# Patient Record
Sex: FEMALE | Race: WHITE | NOT HISPANIC OR LATINO
[De-identification: names, ages, dates, MRNs, and addresses within clinical notes are randomized per-mention and may not be internally consistent; named-entity substitution may affect disease eponyms.]

---
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
## 2020-02-19 ENCOUNTER — APPOINTMENT (OUTPATIENT)
Dept: NEUROLOGY | Facility: CLINIC | Age: 20
End: 2020-02-19

## 2020-02-27 ENCOUNTER — APPOINTMENT (OUTPATIENT)
Dept: THORACIC SURGERY | Facility: CLINIC | Age: 20
End: 2020-02-27

## 2020-05-28 ENCOUNTER — APPOINTMENT (OUTPATIENT)
Dept: THORACIC SURGERY | Facility: CLINIC | Age: 20
End: 2020-05-28

## 2020-05-28 ENCOUNTER — APPOINTMENT (OUTPATIENT)
Dept: NEUROLOGY | Facility: CLINIC | Age: 20
End: 2020-05-28

## 2020-05-29 ENCOUNTER — APPOINTMENT (OUTPATIENT)
Dept: THORACIC SURGERY | Facility: CLINIC | Age: 20
End: 2020-05-29
Payer: COMMERCIAL

## 2020-05-29 PROCEDURE — 99202 OFFICE O/P NEW SF 15 MIN: CPT | Mod: 95

## 2020-06-01 ENCOUNTER — APPOINTMENT (OUTPATIENT)
Dept: NEUROSURGERY | Facility: CLINIC | Age: 20
End: 2020-06-01
Payer: COMMERCIAL

## 2020-06-01 PROCEDURE — 99204 OFFICE O/P NEW MOD 45 MIN: CPT | Mod: 95

## 2020-06-01 NOTE — DATA REVIEWED
[de-identified] : I have reviewed the most recent MRI which shows no evidence of brachial plexus compression [de-identified] : No evidence of vte on RUE doppler

## 2020-06-01 NOTE — REASON FOR VISIT
[New Patient Visit] : a new patient visit [Referred By: _________] : Patient was referred by GUILHERME [FreeTextEntry1] : thoracic outlet syndrom

## 2020-06-01 NOTE — ASSESSMENT
[FreeTextEntry1] : My impression is that the patient suffers from  TOS .   The patient’s established problem of RUE pain and limited ROM  is progressing. I had a long discussion with the patient regarding the role of robotic decompression with Dr. Rodas .  The patient was extensively educated about the nature of her disease process. Therapeutic and diagnostic tests include repat EMG with Dr. Raya. Dr. Rodas's office will arrange surgical clearance. I have explained the alternatives, risks and benefits to the patient and she understands and agrees to proceed.  This risk of the procedure including but not limited to pain, infection, seizure, stroke, recurrence, residual disease, neurovascular injury, heart attack, pulmonary embolism, blindness, weakness, paralysis and death have been carefully explained to the patient who clearly understands and agrees to proceed.\par

## 2020-06-01 NOTE — HISTORY OF PRESENT ILLNESS
[Home] : at home, [unfilled] , at the time of the visit. [Medical Office: ([facility])___] : at the medical office located in  [Verbal consent obtained from patient] : the patient, [unfilled] [FreeTextEntry1] : 20 yr old female, nonsmoker, with no pertinent medical history referred by Dr. Rodas for evaluation of thoracic outlet syndrome. 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 She had PT and took steroids/anti-inflammatories for many months without much relief. She presents today for re-evaluation.\par \par EMG completed on 02/22/19:\par -normal study \par \par \par

## 2020-06-02 ENCOUNTER — APPOINTMENT (OUTPATIENT)
Dept: NEUROLOGY | Facility: CLINIC | Age: 20
End: 2020-06-02
Payer: COMMERCIAL

## 2020-06-02 VITALS
TEMPERATURE: 98.3 F | HEART RATE: 88 BPM | WEIGHT: 112 LBS | DIASTOLIC BLOOD PRESSURE: 80 MMHG | OXYGEN SATURATION: 100 % | SYSTOLIC BLOOD PRESSURE: 122 MMHG | HEIGHT: 59 IN | BODY MASS INDEX: 22.58 KG/M2

## 2020-06-02 DIAGNOSIS — M25.511 PAIN IN RIGHT SHOULDER: ICD-10-CM

## 2020-06-02 DIAGNOSIS — M79.601 PAIN IN RIGHT ARM: ICD-10-CM

## 2020-06-02 DIAGNOSIS — R20.0 ANESTHESIA OF SKIN: ICD-10-CM

## 2020-06-02 PROCEDURE — 95885 MUSC TST DONE W/NERV TST LIM: CPT

## 2020-06-02 PROCEDURE — 95909 NRV CNDJ TST 5-6 STUDIES: CPT

## 2020-06-02 PROCEDURE — 99213 OFFICE O/P EST LOW 20 MIN: CPT | Mod: 25

## 2020-06-02 NOTE — PHYSICAL EXAM
[FreeTextEntry1] : UE strength 5/5 symmetric\par Reflexes in UE 1+ symmetric\par LT/PP intact and symmetric in UE b/l\par Gait normal \par Pain with tilting head to the left and lifting right arm up above shoulder

## 2020-06-02 NOTE — PROCEDURE
[FreeTextEntry1] : Nerve Conduction and Electromyography Report [FreeTextEntry3] : Electro Physiologic Findings:\par \par Limb temperature was monitored and maintained at approximately 32 – 36° C in the upper extremities.\par \par The right median, ulnar, medial and lateral antebrachial cutaneous SNAPs were normal. The right median and ulnar motor responses, including F-wave latencies, were also normal. All responses were approximately stable compared to the prior study in September 2019.\par \par Needle electromyography was performed on the right first dorsal interosseous, abductor pollicis brevis, and extensor digitorum communis muscles. All muscles tested were normal without evidence of active or chronic denervation. \par \par Clinical Electrophysiological Impression: \par \par This repeat electrodiagnostic study demonstrated stability of all sensory and motor responses in the right arm compared to the prior study. There was no definite evidence of lower brachial plexopathy or median or ulnar mononeuropathy.

## 2020-06-02 NOTE — ASSESSMENT
[FreeTextEntry1] : NCS unchanged from prior study in 9/2019\par Limited EMG of RUE unremarkable\par Some of her symptoms are consistent with neurogenic TOS especially provoking movements, although the tremor is not typical \par f/u with Dr. Ugalde, Dr. Schrader for  surgical planning \par \par See separate procedure note for full results of study.

## 2020-06-02 NOTE — HISTORY OF PRESENT ILLNESS
[FreeTextEntry1] : Last seen in September 2019\par Symptoms have gotten worse, in terms of pain in right anterior neck / above clavicle\par Also numbness in the medial right arm / forearm at times, does not go down to the hands\par Pain is worse when she lifts arm up\par She saw Dr. Nguyen who did not find evidence of venous TOS \par \par 13 pt review of systems performed and reviewed with patient (General, Eyes, Ears, Cardiovascular, Respiratory, Gastrointestinal, Genitourinary, Musculoskeletal, Skin, Endocrine, Hematologic, Psychiatric, Neurologic)\par Past medical history, surgical history, social history, and family history reviewed with patient\par See scanned document for details

## 2020-06-03 ENCOUNTER — ASOB RESULT (OUTPATIENT)
Age: 20
End: 2020-06-03

## 2020-06-03 ENCOUNTER — APPOINTMENT (OUTPATIENT)
Dept: OBGYN | Facility: CLINIC | Age: 20
End: 2020-06-03
Payer: COMMERCIAL

## 2020-06-03 VITALS
TEMPERATURE: 98 F | BODY MASS INDEX: 23.39 KG/M2 | DIASTOLIC BLOOD PRESSURE: 76 MMHG | HEART RATE: 85 BPM | HEIGHT: 59 IN | SYSTOLIC BLOOD PRESSURE: 139 MMHG | WEIGHT: 116 LBS

## 2020-06-03 DIAGNOSIS — Z01.411 ENCOUNTER FOR GYNECOLOGICAL EXAMINATION (GENERAL) (ROUTINE) WITH ABNORMAL FINDINGS: ICD-10-CM

## 2020-06-03 DIAGNOSIS — R10.2 PELVIC AND PERINEAL PAIN: ICD-10-CM

## 2020-06-03 DIAGNOSIS — Z30.9 ENCOUNTER FOR CONTRACEPTIVE MANAGEMENT, UNSPECIFIED: ICD-10-CM

## 2020-06-03 PROCEDURE — 99213 OFFICE O/P EST LOW 20 MIN: CPT | Mod: 25

## 2020-06-03 PROCEDURE — 76857 US EXAM PELVIC LIMITED: CPT

## 2020-06-03 PROCEDURE — 99395 PREV VISIT EST AGE 18-39: CPT

## 2020-06-03 PROCEDURE — 76830 TRANSVAGINAL US NON-OB: CPT | Mod: 59

## 2020-06-03 RX ORDER — AMOXICILLIN 875 MG/1
875 TABLET, FILM COATED ORAL
Qty: 14 | Refills: 0 | Status: ACTIVE | COMMUNITY
Start: 2020-05-30

## 2020-06-03 RX ORDER — NORETHINDRONE ACETATE AND ETHINYL ESTRADIOL 1.5-30(21)
1.5-3 KIT ORAL DAILY
Qty: 3 | Refills: 3 | Status: ACTIVE | COMMUNITY
Start: 2020-06-03 | End: 1900-01-01

## 2020-06-03 NOTE — DISCUSSION/SUMMARY
[FreeTextEntry1] : Patient here for annual exam and evaluation of pelvic pain.\par Will get sonogram to help assess.\par Continue oral contraceptives.\par \par Deborah Perez M.D.\par

## 2020-06-03 NOTE — CHIEF COMPLAINT
[Annual Visit] : annual visit [FreeTextEntry1] : Patient is here for annual exam  on [place] FE,, patient complaint  pelvic pain 3-4 days , no nausea ,,no fever , no changes with urination or bowel movement.

## 2020-06-03 NOTE — ASSESSMENT
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 She has been quarantining at home due to the COVID pandemic and reports pain has been worsening even with light physical activity and at rest. When raising right arm, arm and fingers would feel numb. \par \par Thoracic outlet syndrome (TOS) occurs when nerves, arteries, veins are compressed by the first rib. It is caused by repetitive activity or injury of to shoulder, arm, commonly seen in softball players. Patients often report pain, numbness/tingling in the affected shoulder and arm, especially when raised up. If symptoms persist after physical therapy, surgery may be recommended. Surgery involves resection of first rib and cutting into the scalene muscles to decompress the brachial plexus, artery, veins. Prior to surgery, patient will need to be evaluated by a neurosurgeon as well. Many patients reports pain relief after surgery, but there is also a chance that symptoms may reoccur. \par \par Advised patient to undergo RVATs, robotic assisted, right first rib resection, arteriolysis and venolysis. The surgical approach, risks, benefits, and alternatives were explained to the patient, who understood and agreed to the above. \par \par I have reviewed the patient's medical records and diagnostic images at the time of this office consultation and have made the following recommendation.\par Plan:\par 1. Follow-up with Dr Raya for EMG\par 2. Referral to Dr Schrader for pre-op evaluation\par 3. After above, book for RVATs, robotic assisted, right first rib resection, arteriolysis and venolysis in June/July no loss of consciousness/no nausea/no vomiting/no change in level of consciousness

## 2020-06-03 NOTE — PHYSICAL EXAM
[Awake] : awake [Alert] : alert [Acute Distress] : no acute distress [Mass] : no breast mass [Nipple Discharge] : no nipple discharge [Axillary LAD] : no axillary lymphadenopathy [Soft] : soft [Distended] : not distended [Tender] : non tender [H/Smegaly] : no hepatosplenomegaly [Oriented x3] : oriented to person, place, and time [No Bleeding] : there was no active vaginal bleeding [Normal] : uterus [Uterine Adnexae] : were not tender and not enlarged [FreeTextEntry7] : Bilateral full adnexa

## 2020-06-03 NOTE — HISTORY OF PRESENT ILLNESS
[Home] : at home, [unfilled] , at the time of the visit. [Medical Office: ([facility])___] : at the medical office located in  [Verbal consent obtained from patient] : the patient, [unfilled] [FreeTextEntry1] : 20 yr old female, nonsmoker, with no pertinent medical history referred by orthopedist Dr Reynolds for evaluation of thoracic outlet syndrome. 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 She had PT and took steroids/anti-inflammatories for many months without much relief. She presents today for re-evaluation.\par \par EMG completed on 02/22/19:\par -normal study \par \par MRI Brain 3/2/19: No evidence of T2/FLAIR signal abnormality identified to suggest demyelinating disease\par \par MRI cervical spine 3/2/19: No evidence of signal abnormality identified in the cervical cord to suggest demyelinating disease. Broad-based left paracentral disc protrusion of C5-6 which moderately indents the anterior thecal sac on the left without cord deformity. Straightening of the normal cervical lordosis. \par \par MRI shoulder completed on 04/17/19:\par -no bone marrow contusion or fracture\par -no AC joint strain \par -no surrounding soft tissue edema or inflammation\par -no subacromial/subdeltoid bursal fluid\par \par MRI elbow without contrast 04/17/19:\par -bone marrow has normal morphology and signal intensity\par -no contusion or fracture\par -no chondromalacia or arthritis\par -no subchondral edema or cystic change\par -biceps and brachialis tendon have normal morphology and signal with no tendinosis or tear. Triceps tendon and attachment is unremarkable. \par \par Right upper extremity Venous Duplex completed on 04/17/19:\par -no evidence of deep venous thrombosis or superficial thrombophlebitis along the right upper extremity\par \par MRA chest completed on 07/17/19:\par -no evidence of thoracic outlet syndrome\par \par MRI brachial plexus completed on 10/17/19:\par - no extrinsic compression or focal abnormality of right brachial plexus\par

## 2020-06-03 NOTE — END OF VISIT
[FreeTextEntry3] : I, PITA CEDEÑO , am scribing for and in the presence of KHUSHBU STEVENS the following sections: history of present illness, past medical/family/surgical/family/social history, review of systems, vital signs, physical exam, and disposition.\par

## 2020-06-03 NOTE — HISTORY OF PRESENT ILLNESS
[Menarche Age: ____] : age at menarche was [unfilled] [Definite:  ___ (Date)] : the last menstrual period was [unfilled] [Sexually Active] : is sexually active [Contraception] : uses contraception [Male ___] : [unfilled] male [Oral Contraceptives] : uses oral contraceptives [Yes] : yes [___ Year(s) Ago] : [unfilled] year(s) ago [Good] : being in good health [Reproductive Age] : is of reproductive age [Lower-Rt-Q] : lower right quadrant [Lower-Lt-Q] : left lower quadrant [Sharp] : sharp [___ Days] : started [unfilled] days ago [Fever] : no fever [6/10] : is 6/10 in severity [Vomiting] : no vomiting [Nausea] : no nausea [Diarrhea] : no diarrhea [Vaginal Discharge] : no vaginal discharge [Vaginal Bleeding] : no vaginal bleeding [Dysuria] : no dysuria [Pelvic Pressure] : no pelvic pressure [Pain with BMs] : no pain with bowel movements [Dysmenorrhea] : no dysmenorrhea

## 2020-06-04 LAB
C TRACH RRNA SPEC QL NAA+PROBE: NOT DETECTED
N GONORRHOEA RRNA SPEC QL NAA+PROBE: NOT DETECTED
SOURCE AMPLIFICATION: NORMAL

## 2020-06-17 ENCOUNTER — RX RENEWAL (OUTPATIENT)
Age: 20
End: 2020-06-17

## 2020-06-20 ENCOUNTER — APPOINTMENT (OUTPATIENT)
Dept: DISASTER EMERGENCY | Facility: CLINIC | Age: 20
End: 2020-06-20

## 2020-06-22 ENCOUNTER — RX RENEWAL (OUTPATIENT)
Age: 20
End: 2020-06-22

## 2020-06-26 VITALS
DIASTOLIC BLOOD PRESSURE: 76 MMHG | OXYGEN SATURATION: 100 % | TEMPERATURE: 98 F | RESPIRATION RATE: 18 BRPM | HEART RATE: 85 BPM | SYSTOLIC BLOOD PRESSURE: 139 MMHG | WEIGHT: 115.96 LBS | HEIGHT: 59 IN

## 2020-06-26 NOTE — H&P ADULT - NSHPLABSRESULTS_GEN_ALL_CORE
EMG completed on 02/22/19:  -normal study     MRI Brain 3/2/19: No evidence of T2/FLAIR signal abnormality identified to suggest demyelinating disease    MRI cervical spine 3/2/19: No evidence of signal abnormality identified in the cervical cord to suggest demyelinating disease. Broad-based left paracentral disc protrusion of C5-6 which moderately indents the anterior thecal sac on the left without cord deformity. Straightening of the normal cervical lordosis.     MRI shoulder completed on 04/17/19:  -no bone marrow contusion or fracture  -no AC joint strain   -no surrounding soft tissue edema or inflammation  -no subacromial/subdeltoid bursal fluid    MRI elbow without contrast 04/17/19:  -bone marrow has normal morphology and signal intensity  -no contusion or fracture  -no chondromalacia or arthritis  -no subchondral edema or cystic change  -biceps and brachialis tendon have normal morphology and signal with no tendinosis or tear. Triceps tendon and attachment is unremarkable.     Right upper extremity Venous Duplex completed on 04/17/19:  -no evidence of deep venous thrombosis or superficial thrombophlebitis along the right upper extremity    MRA chest completed on 07/17/19:  -no evidence of thoracic outlet syndrome    MRI brachial plexus completed on 10/17/19:  - no extrinsic compression or focal abnormality of right brachial plexus

## 2020-06-26 NOTE — H&P ADULT - HISTORY OF PRESENT ILLNESS
20 yr old female, nonsmoker, with no pertinent medical history referred by orthopedist Dr Reyonlds for evaluation of thoracic outlet syndrome. 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 She had PT and took steroids/anti-inflammatories for many months without much relief. She presents today for re-evaluation.    EMG completed on 02/22/19:  -normal study     MRI Brain 3/2/19: No evidence of T2/FLAIR signal abnormality identified to suggest demyelinating disease    MRI cervical spine 3/2/19: No evidence of signal abnormality identified in the cervical cord to suggest demyelinating disease. Broad-based left paracentral disc protrusion of C5-6 which moderately indents the anterior thecal sac on the left without cord deformity. Straightening of the normal cervical lordosis.     MRI shoulder completed on 04/17/19:  -no bone marrow contusion or fracture  -no AC joint strain   -no surrounding soft tissue edema or inflammation  -no subacromial/subdeltoid bursal fluid    MRI elbow without contrast 04/17/19:  -bone marrow has normal morphology and signal intensity  -no contusion or fracture  -no chondromalacia or arthritis  -no subchondral edema or cystic change  -biceps and brachialis tendon have normal morphology and signal with no tendinosis or tear. Triceps tendon and attachment is unremarkable.     Right upper extremity Venous Duplex completed on 04/17/19:  -no evidence of deep venous thrombosis or superficial thrombophlebitis along the right upper extremity    MRA chest completed on 07/17/19:  -no evidence of thoracic outlet syndrome    MRI brachial plexus completed on 10/17/19:  - no extrinsic compression or focal abnormality of right brachial plexus 20 yr old female, nonsmoker, with no pertinent medical history referred by orthopedist Dr Reynolds for evaluation of thoracic outlet syndrome. 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 She had PT and took steroids/anti-inflammatories for many months without much relief. She presents today for re-evaluation.    EMG completed on 02/22/19:  -normal study     MRI Brain 3/2/19: No evidence of T2/FLAIR signal abnormality identified to suggest demyelinating disease    MRI cervical spine 3/2/19: No evidence of signal abnormality identified in the cervical cord to suggest demyelinating disease. Broad-based left paracentral disc protrusion of C5-6 which moderately indents the anterior thecal sac on the left without cord deformity. Straightening of the normal cervical lordosis.     MRI shoulder completed on 04/17/19:  -no bone marrow contusion or fracture  -no AC joint strain   -no surrounding soft tissue edema or inflammation  -no subacromial/subdeltoid bursal fluid    MRI elbow without contrast 04/17/19:  -bone marrow has normal morphology and signal intensity  -no contusion or fracture  -no chondromalacia or arthritis  -no subchondral edema or cystic change  -biceps and brachialis tendon have normal morphology and signal with no tendinosis or tear. Triceps tendon and attachment is unremarkable.     Right upper extremity Venous Duplex completed on 04/17/19:  -no evidence of deep venous thrombosis or superficial thrombophlebitis along the right upper extremity    MRA chest completed on 07/17/19:  -no evidence of thoracic outlet syndrome    MRI brachial plexus completed on 10/17/19:  - no extrinsic compression or focal abnormality of right brachial plexus    Same Day Holding Area: 6/30/20  Patient seen in same day holding area; Reports no changes to PMHx or medications since last seen by our team. Denies acute or current SOB, chest pain, palpitation, N/V/D, fever/chills, recent illness, or any other concerning symptoms. No sick contacts. Feels well today but is nervous for the procedure.

## 2020-06-26 NOTE — H&P ADULT - ASSESSMENT
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 She has been quarantining at home due to the COVID pandemic and reports pain has been worsening even with light physical activity and at rest. When raising right arm, arm and fingers would feel numb.     Thoracic outlet syndrome (TOS) occurs when nerves, arteries, veins are compressed by the first rib. It is caused by repetitive activity or injury of to shoulder, arm, commonly seen in softball players. Patients often report pain, numbness/tingling in the affected shoulder and arm, especially when raised up. If symptoms persist after physical therapy, surgery may be recommended. Surgery involves resection of first rib and cutting into the scalene muscles to decompress the brachial plexus, artery, veins. Prior to surgery, patient will need to be evaluated by a neurosurgeon as well. Many patients reports pain relief after surgery, but there is also a chance that symptoms may reoccur.     Advised patient to undergo RVATs, robotic assisted, right first rib resection, arteriolysis and venolysis. The surgical approach, risks, benefits, and alternatives were explained to the patient, who understood and agreed to the above.     I have reviewed the patient's medical records and diagnostic images at the time of this office consultation and have made the following recommendation.  Plan:  1. Follow-up with Dr Raya for EMG  2. 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 She has been quarantining at home due to the COVID pandemic and reports pain has been worsening even with light physical activity and at rest. When raising right arm, arm and fingers would feel numb.     Thoracic outlet syndrome (TOS) occurs when nerves, arteries, veins are compressed by the first rib. It is caused by repetitive activity or injury of to shoulder, arm, commonly seen in softball players. Patients often report pain, numbness/tingling in the affected shoulder and arm, especially when raised up. If symptoms persist after physical therapy, surgery may be recommended. Surgery involves resection of first rib and cutting into the scalene muscles to decompress the brachial plexus, artery, veins. Prior to surgery, patient will need to be evaluated by a neurosurgeon as well. Many patients reports pain relief after surgery, but there is also a chance that symptoms may reoccur.     Advised patient to undergo RVATs, robotic assisted, right first rib resection, arteriolysis and venolysis. The surgical approach, risks, benefits, and alternatives were explained to the patient, who understood and agreed to the above.     I have reviewed the patient's medical records and diagnostic images at the time of this office consultation and have made the following recommendation.  Plan:  1. Follow-up with Dr Raya for EMG  2. Referral to Dr Schrader for pre-op evaluation      Admit under Dr. Ugalde via same day surgery. Consent signed, placed on chart.  Risks/benefits reviewed, patient understands and agrees. T&S ordered and blood products placed on hold for OR.

## 2020-06-27 ENCOUNTER — APPOINTMENT (OUTPATIENT)
Dept: DISASTER EMERGENCY | Facility: CLINIC | Age: 20
End: 2020-06-27

## 2020-06-27 LAB — SARS-COV-2 N GENE NPH QL NAA+PROBE: NOT DETECTED

## 2020-06-29 ENCOUNTER — TRANSCRIPTION ENCOUNTER (OUTPATIENT)
Age: 20
End: 2020-06-29

## 2020-06-30 ENCOUNTER — RESULT REVIEW (OUTPATIENT)
Age: 20
End: 2020-06-30

## 2020-06-30 ENCOUNTER — APPOINTMENT (OUTPATIENT)
Dept: THORACIC SURGERY | Facility: HOSPITAL | Age: 20
End: 2020-06-30

## 2020-06-30 ENCOUNTER — INPATIENT (INPATIENT)
Facility: HOSPITAL | Age: 20
LOS: 0 days | Discharge: HOME | DRG: 30 | End: 2020-07-01
Attending: THORACIC SURGERY (CARDIOTHORACIC VASCULAR SURGERY) | Admitting: THORACIC SURGERY (CARDIOTHORACIC VASCULAR SURGERY)
Payer: COMMERCIAL

## 2020-06-30 DIAGNOSIS — Z90.49 ACQUIRED ABSENCE OF OTHER SPECIFIED PARTS OF DIGESTIVE TRACT: Chronic | ICD-10-CM

## 2020-06-30 LAB
ANION GAP SERPL CALC-SCNC: 10 MMOL/L — SIGNIFICANT CHANGE UP (ref 5–17)
APTT BLD: 25.8 SEC — LOW (ref 27.5–35.5)
BASE EXCESS BLDA CALC-SCNC: -2.4 MMOL/L — LOW (ref -2–3)
BASE EXCESS BLDA CALC-SCNC: -5.7 MMOL/L — LOW (ref -2–3)
BASOPHILS # BLD AUTO: 0.02 K/UL — SIGNIFICANT CHANGE UP (ref 0–0.2)
BASOPHILS NFR BLD AUTO: 0.2 % — SIGNIFICANT CHANGE UP (ref 0–2)
BLD GP AB SCN SERPL QL: NEGATIVE — SIGNIFICANT CHANGE UP
BLD GP AB SCN SERPL QL: NEGATIVE — SIGNIFICANT CHANGE UP
BUN SERPL-MCNC: 11 MG/DL — SIGNIFICANT CHANGE UP (ref 7–23)
CA-I BLDA-SCNC: 1.17 MMOL/L — SIGNIFICANT CHANGE UP (ref 1.12–1.3)
CALCIUM SERPL-MCNC: 8.7 MG/DL — SIGNIFICANT CHANGE UP (ref 8.4–10.5)
CHLORIDE SERPL-SCNC: 108 MMOL/L — SIGNIFICANT CHANGE UP (ref 96–108)
CO2 SERPL-SCNC: 19 MMOL/L — LOW (ref 22–31)
COHGB MFR BLDA: 0.1 % — SIGNIFICANT CHANGE UP
CREAT SERPL-MCNC: 0.74 MG/DL — SIGNIFICANT CHANGE UP (ref 0.5–1.3)
EOSINOPHIL # BLD AUTO: 0.02 K/UL — SIGNIFICANT CHANGE UP (ref 0–0.5)
EOSINOPHIL NFR BLD AUTO: 0.2 % — SIGNIFICANT CHANGE UP (ref 0–6)
GLUCOSE BLDC GLUCOMTR-MCNC: 92 MG/DL — SIGNIFICANT CHANGE UP (ref 70–99)
GLUCOSE SERPL-MCNC: 108 MG/DL — HIGH (ref 70–99)
HCO3 BLDA-SCNC: 19 MMOL/L — LOW (ref 21–28)
HCO3 BLDA-SCNC: 21 MMOL/L — SIGNIFICANT CHANGE UP (ref 21–28)
HCT VFR BLD CALC: 37.9 % — SIGNIFICANT CHANGE UP (ref 34.5–45)
HGB BLD-MCNC: 12.7 G/DL — SIGNIFICANT CHANGE UP (ref 11.5–15.5)
HGB BLDA-MCNC: 12.2 G/DL — SIGNIFICANT CHANGE UP (ref 11.5–15.5)
IMM GRANULOCYTES NFR BLD AUTO: 0.4 % — SIGNIFICANT CHANGE UP (ref 0–1.5)
INR BLD: 1 — SIGNIFICANT CHANGE UP (ref 0.88–1.16)
LYMPHOCYTES # BLD AUTO: 0.6 K/UL — LOW (ref 1–3.3)
LYMPHOCYTES # BLD AUTO: 7 % — LOW (ref 13–44)
MAGNESIUM SERPL-MCNC: 1.7 MG/DL — SIGNIFICANT CHANGE UP (ref 1.6–2.6)
MCHC RBC-ENTMCNC: 30.6 PG — SIGNIFICANT CHANGE UP (ref 27–34)
MCHC RBC-ENTMCNC: 33.5 GM/DL — SIGNIFICANT CHANGE UP (ref 32–36)
MCV RBC AUTO: 91.3 FL — SIGNIFICANT CHANGE UP (ref 80–100)
METHGB MFR BLDA: 0.2 % — SIGNIFICANT CHANGE UP
MONOCYTES # BLD AUTO: 0.08 K/UL — SIGNIFICANT CHANGE UP (ref 0–0.9)
MONOCYTES NFR BLD AUTO: 0.9 % — LOW (ref 2–14)
NEUTROPHILS # BLD AUTO: 7.81 K/UL — HIGH (ref 1.8–7.4)
NEUTROPHILS NFR BLD AUTO: 91.3 % — HIGH (ref 43–77)
NRBC # BLD: 0 /100 WBCS — SIGNIFICANT CHANGE UP (ref 0–0)
O2 CT VFR BLDA CALC: 17.7 ML/DL — SIGNIFICANT CHANGE UP (ref 15–23)
OXYHGB MFR BLDA: 99 % — SIGNIFICANT CHANGE UP (ref 94–100)
PCO2 BLDA: 32 MMHG — SIGNIFICANT CHANGE UP (ref 32–45)
PCO2 BLDA: 35 MMHG — SIGNIFICANT CHANGE UP (ref 32–45)
PH BLDA: 7.35 — SIGNIFICANT CHANGE UP (ref 7.35–7.45)
PH BLDA: 7.43 — SIGNIFICANT CHANGE UP (ref 7.35–7.45)
PLATELET # BLD AUTO: 283 K/UL — SIGNIFICANT CHANGE UP (ref 150–400)
PO2 BLDA: 240 MMHG — HIGH (ref 83–108)
PO2 BLDA: 280 MMHG — HIGH (ref 83–108)
POTASSIUM BLDA-SCNC: 3.5 MMOL/L — SIGNIFICANT CHANGE UP (ref 3.5–4.9)
POTASSIUM SERPL-MCNC: 4.4 MMOL/L — SIGNIFICANT CHANGE UP (ref 3.5–5.3)
POTASSIUM SERPL-SCNC: 4.4 MMOL/L — SIGNIFICANT CHANGE UP (ref 3.5–5.3)
PROTHROM AB SERPL-ACNC: 12 SEC — SIGNIFICANT CHANGE UP (ref 10.6–13.6)
RBC # BLD: 4.15 M/UL — SIGNIFICANT CHANGE UP (ref 3.8–5.2)
RBC # FLD: 12.1 % — SIGNIFICANT CHANGE UP (ref 10.3–14.5)
RH IG SCN BLD-IMP: POSITIVE — SIGNIFICANT CHANGE UP
RH IG SCN BLD-IMP: POSITIVE — SIGNIFICANT CHANGE UP
SAO2 % BLDA: 100 % — SIGNIFICANT CHANGE UP (ref 95–100)
SAO2 % BLDA: 100 % — SIGNIFICANT CHANGE UP (ref 95–100)
SODIUM BLDA-SCNC: 138 MMOL/L — SIGNIFICANT CHANGE UP (ref 138–146)
SODIUM SERPL-SCNC: 137 MMOL/L — SIGNIFICANT CHANGE UP (ref 135–145)
WBC # BLD: 8.56 K/UL — SIGNIFICANT CHANGE UP (ref 3.8–10.5)
WBC # FLD AUTO: 8.56 K/UL — SIGNIFICANT CHANGE UP (ref 3.8–10.5)

## 2020-06-30 PROCEDURE — 31645 BRNCHSC W/THER ASPIR 1ST: CPT

## 2020-06-30 PROCEDURE — S2900 ROBOTIC SURGICAL SYSTEM: CPT | Mod: NC

## 2020-06-30 PROCEDURE — 21615 EXCISION 1ST &/CERVICAL RIB: CPT

## 2020-06-30 PROCEDURE — 88300 SURGICAL PATH GROSS: CPT | Mod: 26

## 2020-06-30 PROCEDURE — 21615 EXCISION 1ST &/CERVICAL RIB: CPT | Mod: 80

## 2020-06-30 PROCEDURE — 71045 X-RAY EXAM CHEST 1 VIEW: CPT | Mod: 26

## 2020-06-30 RX ORDER — PANTOPRAZOLE SODIUM 20 MG/1
40 TABLET, DELAYED RELEASE ORAL
Refills: 0 | Status: DISCONTINUED | OUTPATIENT
Start: 2020-06-30 | End: 2020-07-01

## 2020-06-30 RX ORDER — NORETHINDRONE AND ETHINYL ESTRADIOL 0.4-0.035
1 KIT ORAL
Qty: 0 | Refills: 0 | DISCHARGE

## 2020-06-30 RX ORDER — HEPARIN SODIUM 5000 [USP'U]/ML
5000 INJECTION INTRAVENOUS; SUBCUTANEOUS EVERY 8 HOURS
Refills: 0 | Status: DISCONTINUED | OUTPATIENT
Start: 2020-06-30 | End: 2020-07-01

## 2020-06-30 RX ORDER — SODIUM CHLORIDE 9 MG/ML
1000 INJECTION, SOLUTION INTRAVENOUS
Refills: 0 | Status: DISCONTINUED | OUTPATIENT
Start: 2020-06-30 | End: 2020-07-01

## 2020-06-30 RX ORDER — CEFAZOLIN SODIUM 1 G
2000 VIAL (EA) INJECTION EVERY 8 HOURS
Refills: 0 | Status: DISCONTINUED | OUTPATIENT
Start: 2020-06-30 | End: 2020-07-01

## 2020-06-30 RX ORDER — KETOROLAC TROMETHAMINE 30 MG/ML
30 SYRINGE (ML) INJECTION EVERY 6 HOURS
Refills: 0 | Status: DISCONTINUED | OUTPATIENT
Start: 2020-06-30 | End: 2020-07-01

## 2020-06-30 RX ORDER — LIDOCAINE 4 G/100G
1 CREAM TOPICAL DAILY
Refills: 0 | Status: DISCONTINUED | OUTPATIENT
Start: 2020-06-30 | End: 2020-07-01

## 2020-06-30 RX ORDER — ACETAMINOPHEN 500 MG
1000 TABLET ORAL ONCE
Refills: 0 | Status: COMPLETED | OUTPATIENT
Start: 2020-06-30 | End: 2020-06-30

## 2020-06-30 RX ORDER — BUPIVACAINE 13.3 MG/ML
20 INJECTION, SUSPENSION, LIPOSOMAL INFILTRATION ONCE
Refills: 0 | Status: DISCONTINUED | OUTPATIENT
Start: 2020-06-30 | End: 2020-06-30

## 2020-06-30 RX ADMIN — HEPARIN SODIUM 5000 UNIT(S): 5000 INJECTION INTRAVENOUS; SUBCUTANEOUS at 21:03

## 2020-06-30 RX ADMIN — Medication 100 MILLIGRAM(S): at 21:40

## 2020-06-30 RX ADMIN — LIDOCAINE 1 PATCH: 4 CREAM TOPICAL at 20:29

## 2020-06-30 RX ADMIN — LIDOCAINE 1 PATCH: 4 CREAM TOPICAL at 17:36

## 2020-06-30 RX ADMIN — Medication 400 MILLIGRAM(S): at 21:03

## 2020-06-30 NOTE — BRIEF OPERATIVE NOTE - NSICDXBRIEFPROCEDURE_GEN_ALL_CORE_FT
PROCEDURES:  First rib resection 30-Jun-2020 14:48:36 R VATS, robotic assisted right first rib resetion, angiolysis Artemou, Rosie

## 2020-06-30 NOTE — PROGRESS NOTE ADULT - ASSESSMENT
20 yr old female, nonsmoker, with no pertinent medical history referred by orthopedist Dr Reynolds for evaluation of thoracic outlet syndrome. 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 She had PT and took steroids/anti-inflammatories for many months without much relief. Evaluated by Dr. Ugalde and deemed a good surgical candidate. On 6/30/20, patient underwent R VATS, RA 20 yr old female, nonsmoker, with no pertinent medical history referred by orthopedist Dr Reynolds for evaluation of thoracic outlet syndrome. 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 She had PT and took steroids/anti-inflammatories for many months without much relief. Evaluated by Dr. Ugalde and deemed a good surgical candidate. On 6/30/20, patient underwent R VATS, RA, first rib resection. Patient arrived to PACU in stable condition with 1 pigtail in place, no air leak. Plans to remove pigtail this PM.     A/P:  Neurovascular: No delirium. Pain well controlled with current regimen.  - tylenol, toradol PRN for pain.     Cardiovascular: Hemodynamically stable. HR controlled.  - Continue to monitor HR/BP/Tele.       Respiratory: POD0 s/p R VATS RA first rib resection   - Plans to remove pigtail today, per Dr. Ugalde.    - 02 Sat = 98% on 2LNC  -If on oxygen wean to RA from for O2 Sat > 93%.  -Encourage C+DB and Use of IS 10x / hr while awake.  -CXR in AM    GI: Stable.  -PPX with pantoprazole.   -PO Diet.    Renal / : BUN/Cr: 11/0.74  -Monitor renal function.  -Monitor I/O's.    Endocrine:  No hx of thyroid dz or DM.     Hematologic: H&H: 12.7/37.9  -CBC in AM    ID: afebrile  - WBC: 8.56  -Observe for SIRS/Sepsis Syndrome.    Prophylaxis:  -DVT prophylaxis with 5000 SubQ Heparin q8h.  -SCD's    Disposition:  - [facility] tele

## 2020-06-30 NOTE — PROGRESS NOTE ADULT - SUBJECTIVE AND OBJECTIVE BOX
Patient seen at bedside with Dr. Ugalde    Operation / Date: 6/30/20: R VATS, robotic assisted right first rib resection    SUBJECTIVE ASSESSMENT:  20y Female seen in PACU. Offering no acute complaints, reports feeling thankful surgery is over now. Denies chest pain, SOB, palpitations, N/V, abdominal pain, dizziness, fever or chills.      Vital Signs Last 24 Hrs  T(C): 36.4 (30 Jun 2020 14:45), Max: 36.4 (30 Jun 2020 14:45)  T(F): 97.5 (30 Jun 2020 14:45), Max: 97.5 (30 Jun 2020 14:45)  HR: 72 (30 Jun 2020 15:00) (72 - 112)  BP: 126/73 (30 Jun 2020 15:00) (119/73 - 126/73)  BP(mean): 92 (30 Jun 2020 15:00) (90 - 93)  RR: 15 (30 Jun 2020 15:00) (15 - 20)  SpO2: 100% (30 Jun 2020 15:00) (100% - 100%)  I&O's Detail      CHEST TUBE:  Yes. AIR LEAKS: No, on Suction   WILMA DRAIN: No.  EPICARDIAL WIRES: No.  TIE DOWNS: Yes.  RODRIGUEZ: No.    PHYSICAL EXAM:    General: Patient lying comfortably in bed, no acute distress     Neurological: Alert and oriented. No focal neurological deficits     Cardiovascular: S1S2, RRR, no murmurs appreciated on exam     Respiratory: Clear to ausculation bilaterally, no wheezes, rales or rhonchi    Gastrointestinal: Abdomen soft, non tender, non distended     Extremities: Warm and well perfused. No peripheral edema or calf tenderness bilaterally    Vascular: Peripheral pulses palpable bilaterally    Incision Sites:      LABS:                        12.7   8.56  )-----------( 283      ( 30 Jun 2020 15:06 )             37.9       COUMADIN:  No.   PT/INR - ( 30 Jun 2020 15:06 )   PT: 12.0 sec;   INR: 1.00          PTT - ( 30 Jun 2020 15:06 )  PTT:25.8 sec                MEDICATIONS  (STANDING):  acetaminophen  IVPB .. 1000 milliGRAM(s) IV Intermittent once  ceFAZolin   IVPB 2000 milliGRAM(s) IV Intermittent every 8 hours  heparin   Injectable 5000 Unit(s) SubCutaneous every 8 hours  lactated ringers. 1000 milliLiter(s) (50 mL/Hr) IV Continuous <Continuous>  lidocaine   Patch 1 Patch Transdermal daily  pantoprazole    Tablet 40 milliGRAM(s) Oral before breakfast    MEDICATIONS  (PRN):  ketorolac   Injectable 30 milliGRAM(s) IV Push every 6 hours PRN Severe Pain (7 - 10)        RADIOLOGY & ADDITIONAL TESTS: Patient seen at bedside with Dr. Ugalde    Operation / Date: 6/30/20: R VATS, robotic assisted right first rib resection    SUBJECTIVE ASSESSMENT:  20y Female seen in PACU. Offering no acute complaints, reports feeling thankful surgery is over now. Denies chest pain, SOB, palpitations, N/V, abdominal pain, dizziness, fever or chills.      Vital Signs Last 24 Hrs  T(C): 36.4 (30 Jun 2020 14:45), Max: 36.4 (30 Jun 2020 14:45)  T(F): 97.5 (30 Jun 2020 14:45), Max: 97.5 (30 Jun 2020 14:45)  HR: 72 (30 Jun 2020 15:00) (72 - 112)  BP: 126/73 (30 Jun 2020 15:00) (119/73 - 126/73)  BP(mean): 92 (30 Jun 2020 15:00) (90 - 93)  RR: 15 (30 Jun 2020 15:00) (15 - 20)  SpO2: 100% (30 Jun 2020 15:00) (100% - 100%)  I&O's Detail      CHEST TUBE:  Yes. AIR LEAKS: No, on Suction   WILMA DRAIN: No.  EPICARDIAL WIRES: No.  TIE DOWNS: Yes.  RODRIGUEZ: No.    PHYSICAL EXAM:    General: Patient lying comfortably in bed, no acute distress     Neurological: Alert and oriented. No focal neurological deficits     Cardiovascular: S1S2, RRR, no murmurs appreciated on exam     Respiratory: Clear to ausculation bilaterally, no wheezes, rales or rhonchi    Gastrointestinal: Abdomen soft, non tender, non distended     Extremities: Warm and well perfused. No peripheral edema or calf tenderness bilaterally    Vascular: Peripheral pulses palpable bilaterally    Incision Sites: R VATS sites c/d/i.      LABS:                        12.7   8.56  )-----------( 283      ( 30 Jun 2020 15:06 )             37.9       COUMADIN:  No.   PT/INR - ( 30 Jun 2020 15:06 )   PT: 12.0 sec;   INR: 1.00          PTT - ( 30 Jun 2020 15:06 )  PTT:25.8 sec                MEDICATIONS  (STANDING):  acetaminophen  IVPB .. 1000 milliGRAM(s) IV Intermittent once  ceFAZolin   IVPB 2000 milliGRAM(s) IV Intermittent every 8 hours  heparin   Injectable 5000 Unit(s) SubCutaneous every 8 hours  lactated ringers. 1000 milliLiter(s) (50 mL/Hr) IV Continuous <Continuous>  lidocaine   Patch 1 Patch Transdermal daily  pantoprazole    Tablet 40 milliGRAM(s) Oral before breakfast    MEDICATIONS  (PRN):  ketorolac   Injectable 30 milliGRAM(s) IV Push every 6 hours PRN Severe Pain (7 - 10)        RADIOLOGY & ADDITIONAL TESTS:

## 2020-06-30 NOTE — CHART NOTE - NSCHARTNOTEFT_GEN_A_CORE
As per Dr. Ugalde, pigtail catheter removed at bedside without incident. Occlusive dressing applied. Patient tolerated procedure well. Follow up CXR no ptx

## 2020-07-01 ENCOUNTER — TRANSCRIPTION ENCOUNTER (OUTPATIENT)
Age: 20
End: 2020-07-01

## 2020-07-01 VITALS — HEART RATE: 92 BPM | OXYGEN SATURATION: 98 % | DIASTOLIC BLOOD PRESSURE: 61 MMHG | SYSTOLIC BLOOD PRESSURE: 116 MMHG

## 2020-07-01 LAB
ANION GAP SERPL CALC-SCNC: 11 MMOL/L — SIGNIFICANT CHANGE UP (ref 5–17)
BUN SERPL-MCNC: 10 MG/DL — SIGNIFICANT CHANGE UP (ref 7–23)
CALCIUM SERPL-MCNC: 8.1 MG/DL — LOW (ref 8.4–10.5)
CHLORIDE SERPL-SCNC: 106 MMOL/L — SIGNIFICANT CHANGE UP (ref 96–108)
CO2 SERPL-SCNC: 21 MMOL/L — LOW (ref 22–31)
CREAT SERPL-MCNC: 0.73 MG/DL — SIGNIFICANT CHANGE UP (ref 0.5–1.3)
GLUCOSE SERPL-MCNC: 116 MG/DL — HIGH (ref 70–99)
HCT VFR BLD CALC: 37 % — SIGNIFICANT CHANGE UP (ref 34.5–45)
HGB BLD-MCNC: 12 G/DL — SIGNIFICANT CHANGE UP (ref 11.5–15.5)
MAGNESIUM SERPL-MCNC: 1.7 MG/DL — SIGNIFICANT CHANGE UP (ref 1.6–2.6)
MCHC RBC-ENTMCNC: 29.7 PG — SIGNIFICANT CHANGE UP (ref 27–34)
MCHC RBC-ENTMCNC: 32.4 GM/DL — SIGNIFICANT CHANGE UP (ref 32–36)
MCV RBC AUTO: 91.6 FL — SIGNIFICANT CHANGE UP (ref 80–100)
NRBC # BLD: 0 /100 WBCS — SIGNIFICANT CHANGE UP (ref 0–0)
PLATELET # BLD AUTO: 284 K/UL — SIGNIFICANT CHANGE UP (ref 150–400)
POTASSIUM SERPL-MCNC: 3.9 MMOL/L — SIGNIFICANT CHANGE UP (ref 3.5–5.3)
POTASSIUM SERPL-SCNC: 3.9 MMOL/L — SIGNIFICANT CHANGE UP (ref 3.5–5.3)
RBC # BLD: 4.04 M/UL — SIGNIFICANT CHANGE UP (ref 3.8–5.2)
RBC # FLD: 12 % — SIGNIFICANT CHANGE UP (ref 10.3–14.5)
SODIUM SERPL-SCNC: 138 MMOL/L — SIGNIFICANT CHANGE UP (ref 135–145)
WBC # BLD: 12.85 K/UL — HIGH (ref 3.8–10.5)
WBC # FLD AUTO: 12.85 K/UL — HIGH (ref 3.8–10.5)

## 2020-07-01 PROCEDURE — 71045 X-RAY EXAM CHEST 1 VIEW: CPT | Mod: 26

## 2020-07-01 RX ORDER — POTASSIUM CHLORIDE 20 MEQ
20 PACKET (EA) ORAL ONCE
Refills: 0 | Status: COMPLETED | OUTPATIENT
Start: 2020-07-01 | End: 2020-07-01

## 2020-07-01 RX ORDER — PANTOPRAZOLE SODIUM 20 MG/1
1 TABLET, DELAYED RELEASE ORAL
Qty: 30 | Refills: 0
Start: 2020-07-01 | End: 2020-07-30

## 2020-07-01 RX ORDER — ACETAMINOPHEN 500 MG
2 TABLET ORAL
Qty: 112 | Refills: 0
Start: 2020-07-01 | End: 2020-07-14

## 2020-07-01 RX ORDER — ACETAMINOPHEN 500 MG
650 TABLET ORAL EVERY 6 HOURS
Refills: 0 | Status: DISCONTINUED | OUTPATIENT
Start: 2020-07-01 | End: 2020-07-01

## 2020-07-01 RX ORDER — OXYCODONE AND ACETAMINOPHEN 5; 325 MG/1; MG/1
1 TABLET ORAL EVERY 8 HOURS
Refills: 0 | Status: DISCONTINUED | OUTPATIENT
Start: 2020-07-01 | End: 2020-07-01

## 2020-07-01 RX ORDER — MAGNESIUM OXIDE 400 MG ORAL TABLET 241.3 MG
800 TABLET ORAL ONCE
Refills: 0 | Status: COMPLETED | OUTPATIENT
Start: 2020-07-01 | End: 2020-07-01

## 2020-07-01 RX ORDER — IBUPROFEN 200 MG
1 TABLET ORAL
Qty: 28 | Refills: 0
Start: 2020-07-01 | End: 2020-07-07

## 2020-07-01 RX ORDER — IBUPROFEN 200 MG
400 TABLET ORAL EVERY 6 HOURS
Refills: 0 | Status: DISCONTINUED | OUTPATIENT
Start: 2020-07-01 | End: 2020-07-01

## 2020-07-01 RX ADMIN — HEPARIN SODIUM 5000 UNIT(S): 5000 INJECTION INTRAVENOUS; SUBCUTANEOUS at 06:08

## 2020-07-01 RX ADMIN — Medication 30 MILLIGRAM(S): at 09:05

## 2020-07-01 RX ADMIN — MAGNESIUM OXIDE 400 MG ORAL TABLET 800 MILLIGRAM(S): 241.3 TABLET ORAL at 07:54

## 2020-07-01 RX ADMIN — Medication 20 MILLIEQUIVALENT(S): at 07:54

## 2020-07-01 RX ADMIN — PANTOPRAZOLE SODIUM 40 MILLIGRAM(S): 20 TABLET, DELAYED RELEASE ORAL at 06:07

## 2020-07-01 RX ADMIN — Medication 100 MILLIGRAM(S): at 06:07

## 2020-07-01 RX ADMIN — Medication 650 MILLIGRAM(S): at 07:54

## 2020-07-01 RX ADMIN — SODIUM CHLORIDE 50 MILLILITER(S): 9 INJECTION, SOLUTION INTRAVENOUS at 03:17

## 2020-07-01 RX ADMIN — Medication 30 MILLIGRAM(S): at 03:17

## 2020-07-01 RX ADMIN — LIDOCAINE 1 PATCH: 4 CREAM TOPICAL at 06:07

## 2020-07-01 NOTE — DISCHARGE NOTE PROVIDER - NSDCFUADDAPPT_GEN_ALL_CORE_FT
-Please follow up with Dr. Ugalde on 7/9/20 at 12:00pm. The office is located at [facility], [facility], 4th floor. Call us with any questions, #[phone].  - Please follow up with Dr. David Currie (referring) within 1-2 weeks of discharge.

## 2020-07-01 NOTE — DISCHARGE NOTE PROVIDER - NSDCACTIVITY_GEN_ALL_CORE
Walking - Indoors allowed/No heavy lifting/straining/Showering allowed/Stairs allowed/Walking - Outdoors allowed/Do not drive or operate machinery

## 2020-07-01 NOTE — DISCHARGE NOTE PROVIDER - NSDCMRMEDTOKEN_GEN_ALL_CORE_FT
Junel 1.5/30 oral tablet: 1 tab(s) orally once a day acetaminophen 325 mg oral tablet: 2 tab(s) orally every 6 hours, As needed, Mild Pain (1 - 3)  ibuprofen 400 mg oral tablet: 1 tab(s) orally every 6 hours, As needed, Mild Pain (1 - 3)  Junel 1.5/30 oral tablet: 1 tab(s) orally once a day  pantoprazole 40 mg oral delayed release tablet: 1 tab(s) orally once a day (before a meal) acetaminophen 325 mg oral tablet: 2 tab(s) orally every 6 hours, As needed, Mild Pain (1 - 3)  ibuprofen 400 mg oral tablet: 1 tab(s) orally every 6 hours, As needed, Mild Pain (1 - 3)  Junel 1.5/30 oral tablet: 1 tab(s) orally once a day  oxycodone-acetaminophen 5 mg-325 mg oral tablet: 1 tab(s) orally every 8 hours, As needed, Severe Pain (7 - 10) MDD:3 tabs  pantoprazole 40 mg oral delayed release tablet: 1 tab(s) orally once a day (before a meal)

## 2020-07-01 NOTE — DISCHARGE NOTE NURSING/CASE MANAGEMENT/SOCIAL WORK - PATIENT PORTAL LINK FT
You can access the FollowMyHealth Patient Portal offered by [facility] by registering at the following website: [email or web address]. By joining [employer]’s FollowMyHealth portal, you will also be able to view your health information using other applications (apps) compatible with our system.

## 2020-07-01 NOTE — PROGRESS NOTE ADULT - REASON FOR ADMISSION
Elective: RIGHT VATS, robotic assisted, first rib resection, angiolysis, neurolysis
Elective: RIGHT VATS, robotic assisted, first rib resection, angiolysis, neurolysis

## 2020-07-01 NOTE — DISCHARGE NOTE NURSING/CASE MANAGEMENT/SOCIAL WORK - NSDCFUADDAPPT_GEN_ALL_CORE_FT
-Please follow up with Dr. Ugalde on 7/9/20 at 12:00pm. The office is located at [facility], [facility], 4th floor. Call us with any questions, #[phone].  - Please follow up with Dr. David Currie (referring) within 1-2 weeks of discharge.

## 2020-07-01 NOTE — DISCHARGE NOTE PROVIDER - NSDCCPCAREPLAN_GEN_ALL_CORE_FT
PRINCIPAL DISCHARGE DIAGNOSIS  Diagnosis: Thoracic outlet syndrome  Assessment and Plan of Treatment:

## 2020-07-01 NOTE — DISCHARGE NOTE PROVIDER - NSDCFUSCHEDAPPT_GEN_ALL_CORE_FT
MAKEDA VALDIVIA ; 07/09/2020 ; [address]NA[address] MAKEDA VALDIVIA ; 07/09/2020 ; SENAIT Negrete [address] MAKEDA VALDIVIA ; 07/09/2020 ; SENAIT Negrete [address]

## 2020-07-01 NOTE — DISCHARGE NOTE PROVIDER - HOSPITAL COURSE
20 year old F, nonsmoker, with no pertinent medical history referred by orthopedist Dr Reynolds for evaluation of thoracic outlet syndrome. 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 She had PT and took steroids/anti-inflammatories for many months without much relief. Evaluated by Dr. Ugalde and deemed a good surgical candidate. On 6/30/20, patient underwent R VATS, RA, first rib resection. Pigtail removed POD0, f/u CXR no ptx. Today POD1, patient feels well, offers no acute complaints. She is ambulating on RA, using iS pullign 1000cc, tolerating PO Diet, no BM but + flatus. Per Dr. Ugalde patient is ready for discharge.         35 minutes was spent with the patient reviewing the discharge material including medications, follow up appointments, recovery, concerning symptoms, and how to contact their health care providers if they have questions.

## 2020-07-01 NOTE — PROGRESS NOTE ADULT - SUBJECTIVE AND OBJECTIVE BOX
Patient discussed on morning rounds with Dr. Ugalde and Dr. Hopkins    Operation / Date: 6/30/20: R VATS, robotic assisted right first rib resection    Surgeon: Dr. Ugalde    Referring Physician:  Dr. David Reynolds-[facility]    SUBJECTIVE ASSESSMENT:  20y Female seen and examined. Patient feels well, she is ambulating on RA, using IS pulling 1000cc, tolerating PO diet, no BM yet, + flatus. Denies fever, chest pain, palpitations, SOB, abdominal pain, n/v.     Hospital Course:  20 year old F, nonsmoker, with no pertinent medical history referred by orthopedist Dr Reynolds for evaluation of thoracic outlet syndrome. 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 She had PT and took steroids/anti-inflammatories for many months without much relief. Evaluated by Dr. Ugalde and deemed a good surgical candidate. On 6/30/20, patient underwent R VATS, RA, first rib resection. Pigtail removed POD0, f/u CXR no ptx. Today POD1, patient feels well, offers no acute complaints. She is ambulating on RA, using iS pullign 1000cc, tolerating PO Diet, no BM but + flatus. Per Dr. Ugalde patient is ready for discharge.     35 minutes was spent with the patient reviewing the discharge material including medications, follow up appointments, recovery, concerning symptoms, and how to contact their health care providers if they have questions.     Vital Signs Last 24 Hrs  T(C): 36.2 (01 Jul 2020 06:36), Max: 36.6 (30 Jun 2020 22:16)  T(F): 97.2 (01 Jul 2020 06:36), Max: 97.9 (30 Jun 2020 22:16)  HR: 85 (01 Jul 2020 06:15) (70 - 112)  BP: 112/59 (01 Jul 2020 06:15) (107/58 - 129/73)  BP(mean): 81 (01 Jul 2020 06:15) (76 - 95)  RR: 16 (01 Jul 2020 06:15) (9 - 20)  SpO2: 99% (01 Jul 2020 06:15) (97% - 100%)  I&O's Detail    30 Jun 2020 07:01  -  01 Jul 2020 07:00  --------------------------------------------------------  IN:    IV PiggyBack: 100 mL    lactated ringers.: 825 mL    Oral Fluid: 340 mL  Total IN: 1265 mL    OUT:    Chest Tube: 17 mL  Total OUT: 17 mL    Total NET: 1248 mL    PHYSICAL EXAM:    General: Patient lying comfortably in bed, no acute distress     Neurological: Alert and oriented. No focal neurological deficits     Cardiovascular: S1S2, RRR, no murmurs appreciated on exam     Respiratory: Clear to ausculation bilaterally, no wheeze/rhonchi/rales    Gastrointestinal: + BS, soft, non tender, non distended     Extremities: Warm and well perfused. No edema, no calf tenderness     Vascular: palpable peripheral pulses b/l     Incision Sites: R VATS: CDI, no signs of infection or drainage. R Pigtail site: CDI, occlusive dressing.     LABS:                        12.0   12.85 )-----------( 284      ( 01 Jul 2020 06:08 )             37.0       COUMADIN:  No    PT/INR - ( 30 Jun 2020 15:06 )   PT: 12.0 sec;   INR: 1.00          PTT - ( 30 Jun 2020 15:06 )  PTT:25.8 sec    07-01    138  |  106  |  10  ----------------------------<  116<H>  3.9   |  21<L>  |  0.73    Ca    8.1<L>      01 Jul 2020 06:08  Mg     1.7     07-01            MEDICATIONS  (STANDING):  SEE MED REC    Discharge CXR:  CXR pending official read: no obviuos ptx, no consolidation.

## 2020-07-01 NOTE — DISCHARGE NOTE PROVIDER - CARE PROVIDER_API CALL
Clark Ugalde  THORACIC SURGERY  [address] [place] [address]  Phone: [phone]  Fax: [phone]  Follow Up Time:

## 2020-07-01 NOTE — DISCHARGE NOTE PROVIDER - NSDCFUADDINST_GEN_ALL_CORE_FT
-Please keep the dressing where your chest tube was on for 48 hours after discharge. After that you can remove it, and keep it covered with a bandaid.  -If you do not have a bowel movement within 24 hours, please call our office.   -Walk daily as tolerated and use your incentive spirometer every hour.    -No driving or strenuous activity/exercise for 6 weeks, or until cleared by your surgeon.    -You may shower.  Be sure to gently clean your incisions with anti-bacterial soap and water daily, pat dry.  You may leave them open to air.    -Call your doctor if you have shortness of breath, chest pain not relieved by pain medication, dizziness, fever >101.5, or increased redness or drainage from incisions.

## 2020-07-02 LAB — SURGICAL PATHOLOGY STUDY: SIGNIFICANT CHANGE UP

## 2020-07-02 PROCEDURE — 85018 HEMOGLOBIN: CPT

## 2020-07-02 PROCEDURE — 84132 ASSAY OF SERUM POTASSIUM: CPT

## 2020-07-02 PROCEDURE — 82962 GLUCOSE BLOOD TEST: CPT

## 2020-07-02 PROCEDURE — 84295 ASSAY OF SERUM SODIUM: CPT

## 2020-07-02 PROCEDURE — C9399: CPT

## 2020-07-02 PROCEDURE — 86850 RBC ANTIBODY SCREEN: CPT

## 2020-07-02 PROCEDURE — 36415 COLL VENOUS BLD VENIPUNCTURE: CPT

## 2020-07-02 PROCEDURE — 80048 BASIC METABOLIC PNL TOTAL CA: CPT

## 2020-07-02 PROCEDURE — 85027 COMPLETE CBC AUTOMATED: CPT

## 2020-07-02 PROCEDURE — S2900: CPT

## 2020-07-02 PROCEDURE — 85610 PROTHROMBIN TIME: CPT

## 2020-07-02 PROCEDURE — 86901 BLOOD TYPING SEROLOGIC RH(D): CPT

## 2020-07-02 PROCEDURE — 82330 ASSAY OF CALCIUM: CPT

## 2020-07-02 PROCEDURE — 71045 X-RAY EXAM CHEST 1 VIEW: CPT

## 2020-07-02 PROCEDURE — 85025 COMPLETE CBC W/AUTO DIFF WBC: CPT

## 2020-07-02 PROCEDURE — 88300 SURGICAL PATH GROSS: CPT

## 2020-07-02 PROCEDURE — 82803 BLOOD GASES ANY COMBINATION: CPT

## 2020-07-02 PROCEDURE — 85730 THROMBOPLASTIN TIME PARTIAL: CPT

## 2020-07-02 PROCEDURE — 83735 ASSAY OF MAGNESIUM: CPT

## 2020-07-09 ENCOUNTER — APPOINTMENT (OUTPATIENT)
Dept: THORACIC SURGERY | Facility: CLINIC | Age: 20
End: 2020-07-09
Payer: COMMERCIAL

## 2020-07-09 ENCOUNTER — OUTPATIENT (OUTPATIENT)
Dept: OUTPATIENT SERVICES | Facility: HOSPITAL | Age: 20
LOS: 1 days | End: 2020-07-09
Payer: COMMERCIAL

## 2020-07-09 VITALS
TEMPERATURE: 97.5 F | HEIGHT: 59 IN | DIASTOLIC BLOOD PRESSURE: 69 MMHG | RESPIRATION RATE: 18 BRPM | SYSTOLIC BLOOD PRESSURE: 135 MMHG | BODY MASS INDEX: 21.57 KG/M2 | OXYGEN SATURATION: 98 % | HEART RATE: 98 BPM | WEIGHT: 107 LBS

## 2020-07-09 DIAGNOSIS — Z09 ENCOUNTER FOR FOLLOW-UP EXAMINATION AFTER COMPLETED TREATMENT FOR CONDITIONS OTHER THAN MALIGNANT NEOPLASM: ICD-10-CM

## 2020-07-09 DIAGNOSIS — G54.0 BRACHIAL PLEXUS DISORDERS: ICD-10-CM

## 2020-07-09 DIAGNOSIS — Z90.49 ACQUIRED ABSENCE OF OTHER SPECIFIED PARTS OF DIGESTIVE TRACT: Chronic | ICD-10-CM

## 2020-07-09 PROCEDURE — 99024 POSTOP FOLLOW-UP VISIT: CPT

## 2020-07-09 PROCEDURE — 71045 X-RAY EXAM CHEST 1 VIEW: CPT | Mod: 26

## 2020-07-09 PROCEDURE — 71045 X-RAY EXAM CHEST 1 VIEW: CPT

## 2020-08-20 ENCOUNTER — APPOINTMENT (OUTPATIENT)
Dept: THORACIC SURGERY | Facility: CLINIC | Age: 20
End: 2020-08-20
Payer: COMMERCIAL

## 2020-08-20 PROCEDURE — 99024 POSTOP FOLLOW-UP VISIT: CPT

## 2020-09-15 RX ORDER — NORETHINDRONE ACETATE AND ETHINYL ESTRADIOL 1.5-30(21)
1.5-3 KIT ORAL DAILY
Qty: 3 | Refills: 2 | Status: ACTIVE | COMMUNITY
Start: 2020-09-11 | End: 1900-01-01

## 2020-11-19 ENCOUNTER — APPOINTMENT (OUTPATIENT)
Dept: THORACIC SURGERY | Facility: CLINIC | Age: 20
End: 2020-11-19
Payer: COMMERCIAL

## 2020-11-19 DIAGNOSIS — G54.0 BRACHIAL PLEXUS DISORDERS: ICD-10-CM

## 2020-11-19 PROCEDURE — 99212 OFFICE O/P EST SF 10 MIN: CPT | Mod: 95

## 2020-11-20 PROBLEM — G54.0 THORACIC OUTLET SYNDROME: Status: ACTIVE | Noted: 2020-06-08

## 2020-11-24 NOTE — ASSESSMENT
[FreeTextEntry1] : \par 20 year old female, with pmhx of TOS, now 5 months s/p Flexible bronchoscopy, RVATS, resection of right first rib with venolysis, angiolysis and neurolysis on 6/30/20.\par \par Patient reports great improvement in right shoulder pain. Reports mild residual chest wall numbness, but otherwise doing well. She not been playing softball due to the pandemic. She was able to demonstrate to us 10 lb bicep curls at the gym.\par \par I have reviewed the patient's medical records and diagnostic images at the time of this office consultation and have made the following recommendation.\par Plan:\par 1. Follow-up as needed.

## 2020-11-24 NOTE — HISTORY OF PRESENT ILLNESS
[Home] : at home, [unfilled] , at the time of the visit. [Medical Office: ([facility])___] : at the medical office located in  [Verbal consent obtained from patient] : the patient, [unfilled] [FreeTextEntry1] : 20 yr old female, nonsmoker, with no pertinent medical history referred by orthopedist Dr Reynolds for evaluation of thoracic outlet syndrome. 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 She had PT and took steroids/anti-inflammatories for many months without much relief. She is now s/p Flexible bronchoscopy, RVATS, resection of right first rib with venolysis, angiolysis and neurolysis on 6/30/20. She presents today for symptom re-evaluation.\par \par

## 2020-11-24 NOTE — END OF VISIT
[Time Spent: ___ minutes] : I have spent [unfilled] minutes of time on the encounter. [FreeTextEntry3] : I, PITA CEDEÑO , am scribing for and in the presence of KHUSHBU STEVENS the following sections: history of present illness, past medical/family/surgical/family/social history, review of systems, vital signs, physical exam, and disposition.\par  \par I personally performed the services described in the documentation, reviewed the documentation recorded by the scribe in my presence and it accurately and completely records my words and actions.\par

## 2021-07-02 ENCOUNTER — APPOINTMENT (OUTPATIENT)
Dept: OBGYN | Facility: CLINIC | Age: 21
End: 2021-07-02
Payer: COMMERCIAL

## 2021-07-02 VITALS
HEIGHT: 59 IN | WEIGHT: 118 LBS | SYSTOLIC BLOOD PRESSURE: 122 MMHG | DIASTOLIC BLOOD PRESSURE: 72 MMHG | BODY MASS INDEX: 23.79 KG/M2

## 2021-07-02 PROCEDURE — 99395 PREV VISIT EST AGE 18-39: CPT

## 2021-07-02 RX ORDER — DROSPIRENONE AND ETHINYL ESTRADIOL 0.02-3(28)
3-0.02 KIT ORAL DAILY
Qty: 3 | Refills: 1 | Status: ACTIVE | COMMUNITY
Start: 2021-07-02 | End: 1900-01-01

## 2021-07-02 NOTE — HISTORY OF PRESENT ILLNESS
[FreeTextEntry1] : Patient is 21 years old para 0-0-0-0 last menstrual period June 21, 2021\par Patient is presently on oral contraceptives in the form of Blisovi 1.5/30 and states that she has been experiencing headaches.  Patient had an evaluation by a neurologist.  She notes regular menstrual periods every month and denies pain

## 2021-07-02 NOTE — PHYSICAL EXAM
[Appropriately responsive] : appropriately responsive [Alert] : alert [No Acute Distress] : no acute distress [No Lymphadenopathy] : no lymphadenopathy [Regular Rate Rhythm] : regular rate rhythm [No Murmurs] : no murmurs [Clear to Auscultation B/L] : clear to auscultation bilaterally [Soft] : soft [Non-tender] : non-tender [Non-distended] : non-distended [No HSM] : No HSM [No Lesions] : no lesions [No Mass] : no mass [Oriented x3] : oriented x3 [Examination Of The Breasts] : a normal appearance [No Masses] : no breast masses were palpable [Labia Majora] : normal [Labia Minora] : normal [Discharge] : a  ~M vaginal discharge was present [Normal] : normal [Uterine Adnexae] : normal

## 2021-07-02 NOTE — DISCUSSION/SUMMARY
[FreeTextEntry1] : Pap, gonorrhea and Chlamydia test done\par B VV test done\par Issues regarding oral contraceptives and the possibility of side effects including headaches being possibly related to dosage \par Oral contraceptive change to generic Ivone (Lexi) with instructions/precautions\par Keep menstrual calendar\par Follow-up 6 months or as needed

## 2021-07-16 ENCOUNTER — APPOINTMENT (OUTPATIENT)
Dept: NEUROLOGY | Facility: CLINIC | Age: 21
End: 2021-07-16

## 2021-10-18 RX ORDER — NORETHINDRONE ACETATE AND ETHINYL ESTRADIOL 1.5-30(21)
1.5-3 KIT ORAL DAILY
Qty: 3 | Refills: 1 | Status: ACTIVE | COMMUNITY
Start: 2021-10-18 | End: 1900-01-01

## 2022-01-07 ENCOUNTER — APPOINTMENT (OUTPATIENT)
Dept: OBGYN | Facility: CLINIC | Age: 22
End: 2022-01-07

## 2022-01-11 ENCOUNTER — APPOINTMENT (OUTPATIENT)
Dept: OBGYN | Facility: CLINIC | Age: 22
End: 2022-01-11
Payer: COMMERCIAL

## 2022-01-11 VITALS — SYSTOLIC BLOOD PRESSURE: 104 MMHG | DIASTOLIC BLOOD PRESSURE: 62 MMHG | WEIGHT: 119 LBS

## 2022-01-11 DIAGNOSIS — Z87.42 PERSONAL HISTORY OF OTHER DISEASES OF THE FEMALE GENITAL TRACT: ICD-10-CM

## 2022-01-11 PROCEDURE — 99214 OFFICE O/P EST MOD 30 MIN: CPT

## 2022-01-11 RX ORDER — DROSPIRENONE AND ETHINYL ESTRADIOL 0.02-3(28)
3-0.02 KIT ORAL DAILY
Qty: 3 | Refills: 1 | Status: ACTIVE | COMMUNITY
Start: 2022-01-11 | End: 1900-01-01

## 2022-01-11 NOTE — HISTORY OF PRESENT ILLNESS
[FreeTextEntry1] : Patient is 21 years old para 0-0-0-0 last menstrual period January 4, 2022.\par She notes regular menstrual periods on oral contraceptives in the form of generic Ivone ( Lexi) and notes improvement of painful and heavy menstrual periods on oral contraceptives.\par

## 2022-01-27 ENCOUNTER — APPOINTMENT (OUTPATIENT)
Dept: OBGYN | Facility: CLINIC | Age: 22
End: 2022-01-27
Payer: COMMERCIAL

## 2022-01-27 VITALS — WEIGHT: 120 LBS | DIASTOLIC BLOOD PRESSURE: 76 MMHG | SYSTOLIC BLOOD PRESSURE: 118 MMHG

## 2022-01-27 DIAGNOSIS — Z32.02 ENCOUNTER FOR PREGNANCY TEST, RESULT NEGATIVE: ICD-10-CM

## 2022-01-27 LAB — HCG UR QL: NEGATIVE

## 2022-01-27 PROCEDURE — 81025 URINE PREGNANCY TEST: CPT

## 2022-01-27 PROCEDURE — 57454 BX/CURETT OF CERVIX W/SCOPE: CPT

## 2022-01-27 NOTE — PROCEDURE
[Colposcopy] : Colposcopy  [ASCUS] : ASCUS [Time out performed] : Pre-procedure time out performed.  Patient's name, date of birth and procedure confirmed. [Consent Obtained] : Consent obtained [Risks] : risks [Benefits] : benefits [Alternatives] : alternatives [Patient] : patient [Infection] : infection [Bleeding] : bleeding [Allergic Reaction] : allergic reaction [No Premedication] : no premedication [Colposcopy Adequate] : colposcopy adequate [Pap Performed] : pap not performed [SCI Fully Visualized] : SCI fully visualized [ECC Performed] : ECC performed [No Abnormalities] : no abnormalities [Biopsy] : biopsy taken [Hemostasis Obtained] : Hemostasis obtained [Tolerated Well] : the patient tolerated the procedure well [de-identified] : 1 [de-identified] : 12:00 [de-identified] : Acetowhite changes noted

## 2022-08-02 ENCOUNTER — APPOINTMENT (OUTPATIENT)
Dept: OBGYN | Facility: CLINIC | Age: 22
End: 2022-08-02

## 2022-08-02 VITALS — WEIGHT: 120 LBS | DIASTOLIC BLOOD PRESSURE: 64 MMHG | SYSTOLIC BLOOD PRESSURE: 112 MMHG

## 2022-08-02 DIAGNOSIS — Z30.41 ENCOUNTER FOR SURVEILLANCE OF CONTRACEPTIVE PILLS: ICD-10-CM

## 2022-08-02 DIAGNOSIS — Z01.419 ENCOUNTER FOR GYNECOLOGICAL EXAMINATION (GENERAL) (ROUTINE) W/OUT ABNORMAL FINDINGS: ICD-10-CM

## 2022-08-02 PROCEDURE — 99395 PREV VISIT EST AGE 18-39: CPT

## 2022-08-02 RX ORDER — DROSPIRENONE AND ETHINYL ESTRADIOL 0.02-3(28)
3-0.02 KIT ORAL DAILY
Qty: 3 | Refills: 1 | Status: ACTIVE | COMMUNITY
Start: 2022-08-02 | End: 1900-01-01

## 2022-08-02 NOTE — HISTORY OF PRESENT ILLNESS
[FreeTextEntry1] : Patient is 22 years old para 0-0-0-0 last menstrual period July 22, 2022.\par She is presently on oral contraceptives in the form of generic Ivone (Lexi).\par Patient states that she noted cramps with menses 2 months ago which improved this past month.\par She was noted to have an abnormal Pap on January 11, 2022 (ASCUS), she underwent colposcopy on January 27, 2022 which was noted to be within normal limits.

## 2023-01-27 ENCOUNTER — APPOINTMENT (OUTPATIENT)
Dept: OBGYN | Facility: CLINIC | Age: 23
End: 2023-01-27
Payer: COMMERCIAL

## 2023-01-27 VITALS
DIASTOLIC BLOOD PRESSURE: 72 MMHG | SYSTOLIC BLOOD PRESSURE: 114 MMHG | WEIGHT: 120 LBS | BODY MASS INDEX: 24.19 KG/M2 | HEIGHT: 59 IN

## 2023-01-27 DIAGNOSIS — R87.612 LOW GRADE SQUAMOUS INTRAEPITHELIAL LESION ON CYTOLOGIC SMEAR OF CERVIX (LGSIL): ICD-10-CM

## 2023-01-27 DIAGNOSIS — Z87.42 PERSONAL HISTORY OF OTHER DISEASES OF THE FEMALE GENITAL TRACT: ICD-10-CM

## 2023-01-27 DIAGNOSIS — Z30.41 ENCOUNTER FOR SURVEILLANCE OF CONTRACEPTIVE PILLS: ICD-10-CM

## 2023-01-27 DIAGNOSIS — N76.0 ACUTE VAGINITIS: ICD-10-CM

## 2023-01-27 PROCEDURE — 99214 OFFICE O/P EST MOD 30 MIN: CPT

## 2023-01-27 PROCEDURE — 81025 URINE PREGNANCY TEST: CPT

## 2023-01-27 RX ORDER — DROSPIRENONE AND ETHINYL ESTRADIOL 0.02-3(28)
3-0.02 KIT ORAL DAILY
Qty: 3 | Refills: 1 | Status: ACTIVE | COMMUNITY
Start: 2023-01-27 | End: 1900-01-01

## 2023-01-27 NOTE — DISCUSSION/SUMMARY
[FreeTextEntry1] : Pap done\par Self breast exam stressed\par Continue oral contraceptives with instructions/precautions\par Keep menstrual calendar\par Issues regarding recurrent vaginitis discussed with patient including etiologies and treatment options\par Follow-up 6 months or as needed

## 2023-01-27 NOTE — HISTORY OF PRESENT ILLNESS
[FreeTextEntry1] : Patient is 22 years old para 0-0-0-0 last menstrual period January 7, 2023.\par She is doing well on oral contraceptives in the form of generic Ivone (Lexi), she notes improvement of painful and heavy menstrual periods on oral contraceptives and would like to continue with present oral contraceptives.\par Patient has a history of abnormal Pap (ASCUS) on January 11, 2022 and underwent colposcopy on January 27, 2022 which was noted to be within normal limits.  Patient is concerned about recurrent vaginitis secondary to yeast.

## 2023-01-27 NOTE — REASON FOR VISIT
[Follow-Up] : a follow-up evaluation of [Dysmenorrhea] : dysmenorrhea [Menorrhagia] : menorrhagia [Vulvar/Vaginal Complaint] : vulvar/vaginal complaint

## 2023-05-07 ENCOUNTER — EMERGENCY (EMERGENCY)
Facility: HOSPITAL | Age: 23
LOS: 0 days | Discharge: HOME | End: 2023-05-08
Attending: STUDENT IN AN ORGANIZED HEALTH CARE EDUCATION/TRAINING PROGRAM
Payer: COMMERCIAL

## 2023-05-07 VITALS
RESPIRATION RATE: 20 BRPM | HEART RATE: 93 BPM | DIASTOLIC BLOOD PRESSURE: 72 MMHG | TEMPERATURE: 96 F | HEIGHT: 65 IN | OXYGEN SATURATION: 100 % | SYSTOLIC BLOOD PRESSURE: 123 MMHG | WEIGHT: 119.93 LBS

## 2023-05-07 DIAGNOSIS — Z90.49 ACQUIRED ABSENCE OF OTHER SPECIFIED PARTS OF DIGESTIVE TRACT: ICD-10-CM

## 2023-05-07 DIAGNOSIS — R20.2 PARESTHESIA OF SKIN: ICD-10-CM

## 2023-05-07 DIAGNOSIS — G43.909 MIGRAINE, UNSPECIFIED, NOT INTRACTABLE, WITHOUT STATUS MIGRAINOSUS: ICD-10-CM

## 2023-05-07 DIAGNOSIS — R51.9 HEADACHE, UNSPECIFIED: ICD-10-CM

## 2023-05-07 DIAGNOSIS — Z90.49 ACQUIRED ABSENCE OF OTHER SPECIFIED PARTS OF DIGESTIVE TRACT: Chronic | ICD-10-CM

## 2023-05-07 PROCEDURE — 96375 TX/PRO/DX INJ NEW DRUG ADDON: CPT

## 2023-05-07 PROCEDURE — 96374 THER/PROPH/DIAG INJ IV PUSH: CPT

## 2023-05-07 PROCEDURE — 99284 EMERGENCY DEPT VISIT MOD MDM: CPT | Mod: 25

## 2023-05-07 PROCEDURE — 99283 EMERGENCY DEPT VISIT LOW MDM: CPT

## 2023-05-07 RX ORDER — SODIUM CHLORIDE 9 MG/ML
1000 INJECTION INTRAMUSCULAR; INTRAVENOUS; SUBCUTANEOUS ONCE
Refills: 0 | Status: COMPLETED | OUTPATIENT
Start: 2023-05-07 | End: 2023-05-07

## 2023-05-07 RX ORDER — KETOROLAC TROMETHAMINE 30 MG/ML
15 SYRINGE (ML) INJECTION ONCE
Refills: 0 | Status: DISCONTINUED | OUTPATIENT
Start: 2023-05-07 | End: 2023-05-07

## 2023-05-07 RX ORDER — METOCLOPRAMIDE HCL 10 MG
10 TABLET ORAL ONCE
Refills: 0 | Status: COMPLETED | OUTPATIENT
Start: 2023-05-07 | End: 2023-05-07

## 2023-05-07 RX ADMIN — Medication 104 MILLIGRAM(S): at 23:33

## 2023-05-07 RX ADMIN — Medication 15 MILLIGRAM(S): at 23:33

## 2023-05-07 RX ADMIN — SODIUM CHLORIDE 1000 MILLILITER(S): 9 INJECTION INTRAMUSCULAR; INTRAVENOUS; SUBCUTANEOUS at 23:33

## 2023-05-07 NOTE — ED PROVIDER NOTE - OBJECTIVE STATEMENT
22 years old female history of migraine present complaint right-sided headache and tingling sensation to her face for all day today.  Light and noise worsen the headache.  Took sumatriptan this evening with no relief of symptoms.  Feel headache is more severe so she comes to ED for evaluation.  Headache is similar to her prior migraine episodes.  No recent fall or head injury.  Further denies Dizziness/slur speech/extremities weakness and numbness/ facial numbness and weakness. Denies Fever/chill/recent illness/coughing/chest pain/sob/abd pain/n/v/d/extremities pain/urinary sxs. Denies SI/HI/Hallucinations

## 2023-05-07 NOTE — ED PROVIDER NOTE - CLINICAL SUMMARY MEDICAL DECISION MAKING FREE TEXT BOX
22-year-old female past medical history of migraines follows up with neurologist discontinued Nurtec as she had a reaction to that took sumatriptan for the first time today and began having tingling in the right temple and jaw which is what prompted the visit migraine is similar to her previous migraines no recent fevers chills double vision or trauma  vs reviewed meds given with improvement. Instructed to follow up with her neurologist. Return precautions provided.

## 2023-05-07 NOTE — ED PROVIDER NOTE - ATTENDING APP SHARED VISIT CONTRIBUTION OF CARE
22-year-old female past medical history of migraines follows up with neurologist discontinued Nurtec as she had a reaction to that took sumatriptan for the first time today and began having tingling in the right temple and jaw which is what prompted the visit migraine is similar to her previous migraines no recent fevers chills double vision or trauma   CONSTITUTIONAL: WA / WN / NAD  HEAD: NCAT  EYES: PERRL; EOMI;   NECK: Supple; no meningeal signs  SKIN: Warm and dry;   NEURO: AAOx3, Motor 5/5 x 4 extremities normal speech no droop no dysmetria dysarthria or drift.   PSYCH: Memory Intact, Normal Affect

## 2023-05-07 NOTE — ED PROVIDER NOTE - PATIENT PORTAL LINK FT
You can access the FollowMyHealth Patient Portal offered by [facility] by registering at the following website: [email or web address]. By joining [employer]’s FollowMyHealth portal, you will also be able to view your health information using other applications (apps) compatible with our system.

## 2023-05-07 NOTE — ED PROVIDER NOTE - NSFOLLOWUPINSTRUCTIONS_ED_ALL_ED_FT
Please follow up with your neurologist 1-3 days  Please follow up with your PCP 1-3 days     Headache    A headache is pain or discomfort felt around the head or neck area. The specific cause of a headache may not be found as there are many types including tension headaches, migraine headaches, and cluster headaches. Watch your condition for any changes. Things you can do to manage your pain include taking over the counter and prescription medications as instructed by your health care provider, lying down in a dark quiet room, limiting stress, getting regular sleep, and refraining from alcohol and tobacco products.    SEEK IMMEDIATE MEDICAL CARE IF YOU HAVE ANY OF THE FOLLOWING SYMPTOMS: fever, vomiting, stiff neck, loss of vision, problems with speech, muscle weakness, loss of balance, trouble walking, passing out, or confusion.

## 2023-05-08 VITALS
TEMPERATURE: 98 F | SYSTOLIC BLOOD PRESSURE: 134 MMHG | DIASTOLIC BLOOD PRESSURE: 81 MMHG | OXYGEN SATURATION: 98 % | HEART RATE: 100 BPM

## 2023-05-09 PROBLEM — G43.909 MIGRAINE, UNSPECIFIED, NOT INTRACTABLE, WITHOUT STATUS MIGRAINOSUS: Chronic | Status: ACTIVE | Noted: 2023-05-08

## 2023-06-20 ENCOUNTER — APPOINTMENT (OUTPATIENT)
Dept: NEUROLOGY | Facility: CLINIC | Age: 23
End: 2023-06-20

## 2023-07-20 ENCOUNTER — APPOINTMENT (OUTPATIENT)
Dept: NEUROLOGY | Facility: CLINIC | Age: 23
End: 2023-07-20

## 2023-08-04 ENCOUNTER — APPOINTMENT (OUTPATIENT)
Dept: OBGYN | Facility: CLINIC | Age: 23
End: 2023-08-04
Payer: COMMERCIAL

## 2023-08-04 VITALS
DIASTOLIC BLOOD PRESSURE: 68 MMHG | SYSTOLIC BLOOD PRESSURE: 120 MMHG | WEIGHT: 120 LBS | HEIGHT: 59 IN | BODY MASS INDEX: 24.19 KG/M2

## 2023-08-04 DIAGNOSIS — Z30.41 ENCOUNTER FOR SURVEILLANCE OF CONTRACEPTIVE PILLS: ICD-10-CM

## 2023-08-04 DIAGNOSIS — Z01.419 ENCOUNTER FOR GYNECOLOGICAL EXAMINATION (GENERAL) (ROUTINE) W/OUT ABNORMAL FINDINGS: ICD-10-CM

## 2023-08-04 PROCEDURE — 99395 PREV VISIT EST AGE 18-39: CPT

## 2023-08-04 RX ORDER — DROSPIRENONE AND ETHINYL ESTRADIOL 0.02-3(28)
3-0.02 KIT ORAL DAILY
Qty: 3 | Refills: 1 | Status: ACTIVE | COMMUNITY
Start: 2023-08-04 | End: 1900-01-01

## 2023-08-04 NOTE — PHYSICAL EXAM
[Chaperone Present] : A chaperone was present in the examining room during all aspects of the physical examination [FreeTextEntry1] : Samia [Appropriately responsive] : appropriately responsive [Alert] : alert [No Acute Distress] : no acute distress [No Lymphadenopathy] : no lymphadenopathy [No Murmurs] : no murmurs [Regular Rate Rhythm] : regular rate rhythm [Clear to Auscultation B/L] : clear to auscultation bilaterally [Soft] : soft [Non-tender] : non-tender [Non-distended] : non-distended [No HSM] : No HSM [No Lesions] : no lesions [No Mass] : no mass [Oriented x3] : oriented x3 [Examination Of The Breasts] : a normal appearance [No Masses] : no breast masses were palpable [Labia Majora] : normal [Labia Minora] : normal [Normal] : normal [Uterine Adnexae] : normal

## 2023-08-04 NOTE — HISTORY OF PRESENT ILLNESS
[FreeTextEntry1] : Patient is 23 years old para 0-0-0-0 last menstrual period July 20, 2023 She is doing well on oral contraceptives in the form of generic Ivone (Lexi) and would like to continue present oral contraceptives She is presently without complaints.

## 2023-08-04 NOTE — DISCUSSION/SUMMARY
[FreeTextEntry1] : Pap done Self breast exam stressed Continue oral contraceptives with instructions/precautions Keep menstrual calendar Follow-up 6 months or as needed

## 2023-08-17 NOTE — H&P ADULT - GENERAL
----- Message from Natalia Pyle MD sent at 8/16/2023 10:20 PM EDT -----  The NITZA and rheumatoid factor were both positive which does suggest an autoimmune type process. Inflammatory markers were good. I would suggest tilting again with rheumatology I can put in a referral, see if she has a preference on seeing someone. negative

## 2024-01-05 ENCOUNTER — APPOINTMENT (OUTPATIENT)
Dept: OBGYN | Facility: CLINIC | Age: 24
End: 2024-01-05
Payer: COMMERCIAL

## 2024-01-05 VITALS
WEIGHT: 121 LBS | BODY MASS INDEX: 24.39 KG/M2 | SYSTOLIC BLOOD PRESSURE: 100 MMHG | HEIGHT: 59 IN | DIASTOLIC BLOOD PRESSURE: 60 MMHG

## 2024-01-05 DIAGNOSIS — Z30.41 ENCOUNTER FOR SURVEILLANCE OF CONTRACEPTIVE PILLS: ICD-10-CM

## 2024-01-05 DIAGNOSIS — N92.0 EXCESSIVE AND FREQUENT MENSTRUATION WITH REGULAR CYCLE: ICD-10-CM

## 2024-01-05 DIAGNOSIS — N94.6 DYSMENORRHEA, UNSPECIFIED: ICD-10-CM

## 2024-01-05 PROCEDURE — 99214 OFFICE O/P EST MOD 30 MIN: CPT

## 2024-01-05 RX ORDER — DROSPIRENONE AND ETHINYL ESTRADIOL 0.02-3(28)
3-0.02 KIT ORAL DAILY
Qty: 3 | Refills: 1 | Status: ACTIVE | COMMUNITY
Start: 2024-01-05 | End: 1900-01-01

## 2024-01-05 NOTE — HISTORY OF PRESENT ILLNESS
[FreeTextEntry1] : Patient is 23 years old para 0-0-0-0 last menstrual period December 5, 2023 She is doing well on oral contraceptives in the form of generic Ivone (Lexi) and notes improvement of painful heavy menstrual periods on oral contraceptives.  She would like to continue present oral contraceptives.

## 2024-01-05 NOTE — PHYSICAL EXAM
[Chaperone Present] : A chaperone was present in the examining room during all aspects of the physical examination [FreeTextEntry1] : Samia [Appropriately responsive] : appropriately responsive [Alert] : alert [No Acute Distress] : no acute distress [No Lymphadenopathy] : no lymphadenopathy [Regular Rate Rhythm] : regular rate rhythm [No Murmurs] : no murmurs [Clear to Auscultation B/L] : clear to auscultation bilaterally [Soft] : soft [Non-tender] : non-tender [Non-distended] : non-distended [No HSM] : No HSM [No Lesions] : no lesions [No Mass] : no mass [Oriented x3] : oriented x3 [Examination Of The Breasts] : a normal appearance [No Masses] : no breast masses were palpable [Labia Majora] : normal [Labia Minora] : normal [Normal] : normal [Uterine Adnexae] : normal

## 2024-08-09 ENCOUNTER — APPOINTMENT (OUTPATIENT)
Dept: OBGYN | Facility: CLINIC | Age: 24
End: 2024-08-09

## 2024-08-09 PROBLEM — E28.2 POLYCYSTIC OVARIES: Status: ACTIVE | Noted: 2024-08-09

## 2024-08-09 PROBLEM — Z87.42 HISTORY OF DYSMENORRHEA: Status: RESOLVED | Noted: 2023-01-27 | Resolved: 2024-08-09

## 2024-08-09 PROBLEM — N94.6 DYSMENORRHEA: Status: ACTIVE | Noted: 2024-08-09

## 2024-08-09 PROBLEM — N92.1 BREAKTHROUGH BLEEDING ON OCPS: Status: ACTIVE | Noted: 2024-08-09

## 2024-08-09 PROCEDURE — 99395 PREV VISIT EST AGE 18-39: CPT

## 2024-08-09 PROCEDURE — 99459 PELVIC EXAMINATION: CPT

## 2024-08-09 PROCEDURE — 76830 TRANSVAGINAL US NON-OB: CPT

## 2024-08-09 PROCEDURE — 99213 OFFICE O/P EST LOW 20 MIN: CPT | Mod: 25

## 2024-08-09 NOTE — HISTORY OF PRESENT ILLNESS
[FreeTextEntry1] : Patient is 24 years old para 0-0-0-0 last menstrual period July 21, 2024 Patient states that she noted breakthrough bleeding on oral contraceptives on August 5 through 7 which was noted to be heavy Prior to being on oral contraceptives she noted a history of painful and heavy menstrual periods

## 2024-08-09 NOTE — DISCUSSION/SUMMARY
[FreeTextEntry1] : Pap done Self breast exam stressed Continue oral contraceptives with instructions/precautions Prescribed hormone profile Keep menstrual calendar Follow-up 6 months or as needed

## 2024-08-09 NOTE — PROCEDURE
[Cervical Pap Smear] : cervical Pap smear [Liquid Base] : liquid base [Tolerated Well] : the patient tolerated the procedure well [No Complications] : there were no complications [Abnormal Uterine Bleeding] : abnormal uterine bleeding [Suspected Polycystic Ovaries] : suspected polycystic ovaries [Transvaginal Ultrasound] : transvaginal ultrasound [Anteverted] : anteverted [No Fibroid(s)] : no fibroid(s) [L: ___ cm] : L: [unfilled] cm [H: ___ cm] : H: [unfilled] cm [FreeTextEntry7] : 1.8 x 3.0 cm [FreeTextEntry8] : 2.0 x 3.4 cm [FreeTextEntry6] : Bilateral ovaries contain multiple subcentimeter cysts

## 2024-08-09 NOTE — PHYSICAL EXAM
[Chaperone Present] : A chaperone was present in the examining room during all aspects of the physical examination [92000] : A chaperone was present during the pelvic exam. [FreeTextEntry2] : Collette Quinones [Appropriately responsive] : appropriately responsive [Alert] : alert [No Acute Distress] : no acute distress [No Lymphadenopathy] : no lymphadenopathy [Regular Rate Rhythm] : regular rate rhythm [No Murmurs] : no murmurs [Clear to Auscultation B/L] : clear to auscultation bilaterally [Soft] : soft [Non-tender] : non-tender [Non-distended] : non-distended [No HSM] : No HSM [No Lesions] : no lesions [No Mass] : no mass [Oriented x3] : oriented x3 [Examination Of The Breasts] : a normal appearance [No Masses] : no breast masses were palpable [Labia Majora] : normal [Labia Minora] : normal [Normal] : normal [Uterine Adnexae] : normal

## 2024-11-12 ENCOUNTER — APPOINTMENT (OUTPATIENT)
Dept: NEUROLOGY | Facility: CLINIC | Age: 24
End: 2024-11-12
Payer: COMMERCIAL

## 2024-11-12 VITALS
SYSTOLIC BLOOD PRESSURE: 111 MMHG | HEART RATE: 73 BPM | BODY MASS INDEX: 23.56 KG/M2 | WEIGHT: 120 LBS | HEIGHT: 60 IN | DIASTOLIC BLOOD PRESSURE: 74 MMHG

## 2024-11-12 DIAGNOSIS — G43.709 CHRONIC MIGRAINE W/OUT AURA, NOT INTRACTABLE, W/OUT STATUS MIGRAINOSUS: ICD-10-CM

## 2024-11-12 DIAGNOSIS — G47.30 SLEEP APNEA, UNSPECIFIED: ICD-10-CM

## 2024-11-12 PROCEDURE — 99204 OFFICE O/P NEW MOD 45 MIN: CPT

## 2024-11-12 RX ORDER — MAGNESIUM 200 MG
200 TABLET ORAL DAILY
Qty: 30 | Refills: 5 | Status: ACTIVE | COMMUNITY
Start: 2024-11-12 | End: 1900-01-01

## 2024-11-12 RX ORDER — RIMEGEPANT SULFATE 75 MG/75MG
75 TABLET, ORALLY DISINTEGRATING ORAL
Qty: 16 | Refills: 3 | Status: ACTIVE | COMMUNITY
Start: 2024-11-12 | End: 1900-01-01

## 2025-01-24 ENCOUNTER — OUTPATIENT (OUTPATIENT)
Dept: OUTPATIENT SERVICES | Facility: HOSPITAL | Age: 25
LOS: 1 days | Discharge: HOME | End: 2025-01-24
Payer: COMMERCIAL

## 2025-01-24 ENCOUNTER — APPOINTMENT (OUTPATIENT)
Dept: SLEEP CENTER | Facility: HOSPITAL | Age: 25
End: 2025-01-24

## 2025-01-24 DIAGNOSIS — Z90.49 ACQUIRED ABSENCE OF OTHER SPECIFIED PARTS OF DIGESTIVE TRACT: Chronic | ICD-10-CM

## 2025-01-24 DIAGNOSIS — G47.33 OBSTRUCTIVE SLEEP APNEA (ADULT) (PEDIATRIC): ICD-10-CM

## 2025-01-24 PROCEDURE — 95810 POLYSOM 6/> YRS 4/> PARAM: CPT | Mod: 26

## 2025-01-24 PROCEDURE — 95810 POLYSOM 6/> YRS 4/> PARAM: CPT

## 2025-01-26 DIAGNOSIS — G47.33 OBSTRUCTIVE SLEEP APNEA (ADULT) (PEDIATRIC): ICD-10-CM

## 2025-02-06 ENCOUNTER — APPOINTMENT (OUTPATIENT)
Dept: OBGYN | Facility: CLINIC | Age: 25
End: 2025-02-06
Payer: COMMERCIAL

## 2025-02-06 VITALS
WEIGHT: 120 LBS | DIASTOLIC BLOOD PRESSURE: 74 MMHG | SYSTOLIC BLOOD PRESSURE: 109 MMHG | HEIGHT: 61 IN | BODY MASS INDEX: 22.66 KG/M2

## 2025-02-06 DIAGNOSIS — N94.6 DYSMENORRHEA, UNSPECIFIED: ICD-10-CM

## 2025-02-06 DIAGNOSIS — N92.0 EXCESSIVE AND FREQUENT MENSTRUATION WITH REGULAR CYCLE: ICD-10-CM

## 2025-02-06 DIAGNOSIS — Z30.41 ENCOUNTER FOR SURVEILLANCE OF CONTRACEPTIVE PILLS: ICD-10-CM

## 2025-02-06 DIAGNOSIS — N92.1 EXCESSIVE AND FREQUENT MENSTRUATION WITH IRREGULAR CYCLE: ICD-10-CM

## 2025-02-06 PROCEDURE — 99214 OFFICE O/P EST MOD 30 MIN: CPT | Mod: 25

## 2025-02-06 PROCEDURE — 99459 PELVIC EXAMINATION: CPT

## 2025-02-14 ENCOUNTER — APPOINTMENT (OUTPATIENT)
Dept: NEUROLOGY | Facility: CLINIC | Age: 25
End: 2025-02-14
Payer: COMMERCIAL

## 2025-02-14 VITALS
SYSTOLIC BLOOD PRESSURE: 128 MMHG | HEART RATE: 82 BPM | BODY MASS INDEX: 23.56 KG/M2 | DIASTOLIC BLOOD PRESSURE: 80 MMHG | HEIGHT: 60 IN | WEIGHT: 120 LBS

## 2025-02-14 DIAGNOSIS — G43.709 CHRONIC MIGRAINE W/OUT AURA, NOT INTRACTABLE, W/OUT STATUS MIGRAINOSUS: ICD-10-CM

## 2025-02-14 DIAGNOSIS — G47.30 SLEEP APNEA, UNSPECIFIED: ICD-10-CM

## 2025-02-14 PROCEDURE — 99215 OFFICE O/P EST HI 40 MIN: CPT

## 2025-05-06 RX ORDER — DROSPIRENONE AND ETHINYL ESTRADIOL 0.02-3(28)
3-0.02 KIT ORAL DAILY
Qty: 3 | Refills: 1 | Status: ACTIVE | COMMUNITY
Start: 2025-05-06 | End: 1900-01-01

## 2025-05-14 ENCOUNTER — APPOINTMENT (OUTPATIENT)
Dept: NEUROLOGY | Facility: CLINIC | Age: 25
End: 2025-05-14
Payer: COMMERCIAL

## 2025-05-14 DIAGNOSIS — G43.709 CHRONIC MIGRAINE W/OUT AURA, NOT INTRACTABLE, W/OUT STATUS MIGRAINOSUS: ICD-10-CM

## 2025-05-14 PROCEDURE — 99213 OFFICE O/P EST LOW 20 MIN: CPT

## 2025-08-14 ENCOUNTER — APPOINTMENT (OUTPATIENT)
Dept: OBGYN | Facility: CLINIC | Age: 25
End: 2025-08-14

## 2025-08-14 VITALS — SYSTOLIC BLOOD PRESSURE: 112 MMHG | DIASTOLIC BLOOD PRESSURE: 64 MMHG

## 2025-08-14 VITALS — BODY MASS INDEX: 23.56 KG/M2 | WEIGHT: 120 LBS | HEIGHT: 60 IN

## 2025-08-14 DIAGNOSIS — Z30.41 ENCOUNTER FOR SURVEILLANCE OF CONTRACEPTIVE PILLS: ICD-10-CM

## 2025-08-14 DIAGNOSIS — Z01.419 ENCOUNTER FOR GYNECOLOGICAL EXAMINATION (GENERAL) (ROUTINE) W/OUT ABNORMAL FINDINGS: ICD-10-CM

## 2025-08-14 PROCEDURE — 99395 PREV VISIT EST AGE 18-39: CPT | Mod: 25

## 2025-08-14 PROCEDURE — 99459 PELVIC EXAMINATION: CPT | Mod: NC

## 2025-08-14 RX ORDER — DROSPIRENONE AND ETHINYL ESTRADIOL 0.02-3(28)
3-0.02 KIT ORAL DAILY
Qty: 3 | Refills: 1 | Status: ACTIVE | COMMUNITY
Start: 2025-08-14 | End: 1900-01-01

## 2025-09-18 ENCOUNTER — APPOINTMENT (OUTPATIENT)
Dept: NEUROLOGY | Facility: CLINIC | Age: 25
End: 2025-09-18
Payer: COMMERCIAL

## 2025-09-18 VITALS
HEIGHT: 60 IN | HEART RATE: 80 BPM | SYSTOLIC BLOOD PRESSURE: 102 MMHG | DIASTOLIC BLOOD PRESSURE: 66 MMHG | WEIGHT: 120 LBS | BODY MASS INDEX: 23.56 KG/M2

## 2025-09-18 DIAGNOSIS — G43.709 CHRONIC MIGRAINE W/OUT AURA, NOT INTRACTABLE, W/OUT STATUS MIGRAINOSUS: ICD-10-CM

## 2025-09-18 PROCEDURE — 99213 OFFICE O/P EST LOW 20 MIN: CPT
